# Patient Record
Sex: MALE | Race: WHITE | NOT HISPANIC OR LATINO | URBAN - METROPOLITAN AREA
[De-identification: names, ages, dates, MRNs, and addresses within clinical notes are randomized per-mention and may not be internally consistent; named-entity substitution may affect disease eponyms.]

---

## 2018-02-02 ENCOUNTER — OFFICE VISIT - HEALTHEAST (OUTPATIENT)
Dept: FAMILY MEDICINE | Facility: CLINIC | Age: 38
End: 2018-02-02

## 2018-02-02 DIAGNOSIS — Z76.89 ESTABLISHING CARE WITH NEW DOCTOR, ENCOUNTER FOR: ICD-10-CM

## 2018-02-02 DIAGNOSIS — J02.9 SORE THROAT: ICD-10-CM

## 2018-02-02 DIAGNOSIS — H93.8X3 PLUGGED FEELING IN EAR, BILATERAL: ICD-10-CM

## 2018-02-02 DIAGNOSIS — Z23 NEED FOR VACCINATION: ICD-10-CM

## 2018-02-02 LAB — DEPRECATED S PYO AG THROAT QL EIA: NORMAL

## 2018-02-02 ASSESSMENT — MIFFLIN-ST. JEOR: SCORE: 1810.5

## 2018-02-03 LAB — GROUP A STREP BY PCR: NORMAL

## 2018-02-06 ENCOUNTER — COMMUNICATION - HEALTHEAST (OUTPATIENT)
Dept: FAMILY MEDICINE | Facility: CLINIC | Age: 38
End: 2018-02-06

## 2018-02-06 ENCOUNTER — AMBULATORY - HEALTHEAST (OUTPATIENT)
Dept: FAMILY MEDICINE | Facility: CLINIC | Age: 38
End: 2018-02-06

## 2018-02-06 DIAGNOSIS — J35.1 ENLARGED TONSILS: ICD-10-CM

## 2018-02-27 ENCOUNTER — COMMUNICATION - HEALTHEAST (OUTPATIENT)
Dept: ADMINISTRATIVE | Facility: CLINIC | Age: 38
End: 2018-02-27

## 2021-06-01 VITALS — BODY MASS INDEX: 26.14 KG/M2 | WEIGHT: 193 LBS | HEIGHT: 72 IN

## 2021-06-15 NOTE — PROGRESS NOTES
Office Visit - New Patient   Heraclio Hong   37 y.o.  male    Date of visit: 2/2/2018  Physician: Sowmya Arreola CNP     Assessment and Plan   1. Establishing care with new doctor, encounter for     2. Plugged feeling in ear, bilateral     3. Sore throat  Rapid Strep A Screen-Throat    Group A Strep, RNA Direct Detection, Throat   4. Need for vaccination  Influenza, Seasonal,Quad Inj, 36+ MOS    Tdap vaccine greater than or equal to 6yo IM     Patient takes no prescription medications    The following high BMI interventions were performed this visit: encouragement to exercise and weight monitoring    Return to clinic if symptoms persist or become severe in the next 7-10 days.  Rapid strep testing negative today.      Chief Complaint   Chief Complaint   Patient presents with     Establish Care     Illness     sx started ruth [facility] - irritated, itchy throat, plugged/painful mostly left side        Patient Profile   Social History     Social History Narrative    Lives with wife Avis and 2 kids, ages 9 and 11 (02/02/2018)        Past Medical History   Patient Active Problem List   Diagnosis     Establishing care with new doctor, encounter for     Plugged feeling in ear, bilateral     Sore throat       Past Surgical History  He has a past surgical history that includes Ankle surgery (N/A, 10/1995).     History of Present Illness   This 37 y.o. old who presents to establish care.  Medical, family and surgical history reviewed with patient.  Surgical history includes ankle surgery in 1995 for fracture, pins were placed and have since been removed.  Parents are both living, father has Parkinson's.   to wife Avis, he has 2 children ages 9 and 11.  He works full-time as a  for a bank.  He does drink 2 alcoholic beverages per week, he quit chewing tobacco and smoking in 2012, he denies any illicit drug use.  Main reason for visit today is ongoing left ear discomfort and itchy  throat that has been ongoing since [place] time.  He states that he can hear fluid in his left ear.  His throat has been scratchy constantly since [place], left ear discomfort is intermittent.  He describes his left ear and throat pain as a dull sensation.  Aggravating factors: Laying flat.  Relieving factors nothing he has tried, he has been performing salt water gargles regularly.  Is rating his throat and ear discomfort a 2 out of 10 today.  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  He does not grind his teeth as far as he knows at night and does not wear mouthguard.  He denies any recent exposure to illness, he has traveled to [place] in the last few weeks, he is scheduled to travel in the next 2 weeks to [place].  Does not smoke and has no history of asthma or diabetes.  Health maintenance reviewed: Tdap and influenza vaccinations administered today.  Patient's vitals are stable today.    Review of Systems: A comprehensive review of systems was negative except as noted.     Medications and Allergies   No current outpatient prescriptions on file.     No current facility-administered medications for this visit.      Allergies   Allergen Reactions     Penicillins Swelling     Bactrim [Sulfamethoxazole-Trimethoprim] Rash     Itch rash all over        Family and Social History   Family History   Problem Relation Age of Onset     No Medical Problems Mother      Parkinsonism Father      Alzheimer's disease Paternal Grandmother      Cancer Paternal Grandfather      Bladder cancer     No Medical Problems Sister      No Medical Problems Brother      No Medical Problems Brother         Social History   Substance Use Topics     Smoking status: Former Smoker     Quit date: 10/12/2012     Smokeless tobacco: Never Used     Alcohol use Yes      Comment: 2 per week        Physical Exam   General Appearance:   Alert and oriented ×4, no acute distress, pleasant    /60  Pulse 85  " Resp 16  Ht 5' 11.5\" (1.816 m)  Wt 193 lb (87.5 kg)  SpO2 97%  BMI 26.54 kg/m2    EYES: Eyelids, conjunctiva, and sclera were normal. Pupils were normal. Cornea, iris, and lens were normal bilaterally.  HEAD, EARS, NOSE, MOUTH, AND THROAT: Head and face were normal. Hearing was normal to voice and the ears were normal to external exam. Nose appearance was normal.  Right nare with thick, yellow secretions, left nare with thin, clear secretions.  Oropharynx was normal.  Normal jaw alignment with opening and closing, no clicking noted  NECK: Neck appearance was normal. There were no neck masses and the thyroid was not enlarged.  RESPIRATORY: Breathing pattern was normal and the chest moved symmetrically.  Percussion/auscultatory percussion was normal.  Lung sounds were normal and there were no abnormal sounds.  CARDIOVASCULAR: Heart rate and rhythm were normal.  S1 and S2 were normal and there were no extra sounds or murmurs. Peripheral pulses in arms and legs were normal.  Jugular venous pressure was normal.  There was no peripheral edema.  GASTROINTESTINAL: The abdomen was normal in contour.  Bowel sounds were present.  Percussion detected no organ enlargement or tenderness.  Palpation detected no tenderness, mass, or enlarged organs.   LYMPHATIC: There were no enlarged nodes.  SKIN/HAIR/NAILS: Skin color was normal.  There were no skin lesions.  Hair and nails were normal.       Additional Information     Review and/or order of clinical lab tests:  Review and/or order of radiology tests:  Review and/or order of medicine tests:  Discussion of test results with performing physician:  Decision to obtain old records and/or obtain history from someone other than the patient:  Review and summarization of old records and/or obtaining history from someone other than the patient and.or discussion of case with another health care provider:  Independent visualization of image, tracing or specimen itself:    Time: total " time spent with the patient was 45 minutes of which >50% was spent in counseling and coordination of care     Sowmya Arreola, CNP  Family Nurse Practitioner  [facility]  [phone]

## 2021-06-16 PROBLEM — Z76.89 ESTABLISHING CARE WITH NEW DOCTOR, ENCOUNTER FOR: Status: ACTIVE | Noted: 2018-02-02

## 2021-06-16 PROBLEM — H93.8X3 PLUGGED FEELING IN EAR, BILATERAL: Status: ACTIVE | Noted: 2018-02-02

## 2021-06-16 PROBLEM — J02.9 SORE THROAT: Status: ACTIVE | Noted: 2018-02-02
